# Patient Record
Sex: MALE | Race: BLACK OR AFRICAN AMERICAN | NOT HISPANIC OR LATINO | Employment: FULL TIME | URBAN - METROPOLITAN AREA
[De-identification: names, ages, dates, MRNs, and addresses within clinical notes are randomized per-mention and may not be internally consistent; named-entity substitution may affect disease eponyms.]

---

## 2017-05-04 ENCOUNTER — ALLSCRIPTS OFFICE VISIT (OUTPATIENT)
Dept: OTHER | Facility: OTHER | Age: 52
End: 2017-05-04

## 2017-05-26 ENCOUNTER — GENERIC CONVERSION - ENCOUNTER (OUTPATIENT)
Dept: OTHER | Facility: OTHER | Age: 52
End: 2017-05-26

## 2017-05-26 LAB
MAGNESIUM, UR (HISTORICAL): NORMAL
MICROALBUMIN/CREATININE RATIO (HISTORICAL): NORMAL

## 2017-06-13 ENCOUNTER — GENERIC CONVERSION - ENCOUNTER (OUTPATIENT)
Dept: OTHER | Facility: OTHER | Age: 52
End: 2017-06-13

## 2017-06-26 ENCOUNTER — GENERIC CONVERSION - ENCOUNTER (OUTPATIENT)
Dept: OTHER | Facility: OTHER | Age: 52
End: 2017-06-26

## 2017-07-11 ENCOUNTER — GENERIC CONVERSION - ENCOUNTER (OUTPATIENT)
Dept: OTHER | Facility: OTHER | Age: 52
End: 2017-07-11

## 2017-07-24 ENCOUNTER — GENERIC CONVERSION - ENCOUNTER (OUTPATIENT)
Dept: OTHER | Facility: OTHER | Age: 52
End: 2017-07-24

## 2017-08-08 ENCOUNTER — GENERIC CONVERSION - ENCOUNTER (OUTPATIENT)
Dept: OTHER | Facility: OTHER | Age: 52
End: 2017-08-08

## 2017-08-23 ENCOUNTER — GENERIC CONVERSION - ENCOUNTER (OUTPATIENT)
Dept: OTHER | Facility: OTHER | Age: 52
End: 2017-08-23

## 2017-09-05 ENCOUNTER — GENERIC CONVERSION - ENCOUNTER (OUTPATIENT)
Dept: OTHER | Facility: OTHER | Age: 52
End: 2017-09-05

## 2017-09-11 ENCOUNTER — GENERIC CONVERSION - ENCOUNTER (OUTPATIENT)
Dept: OTHER | Facility: OTHER | Age: 52
End: 2017-09-11

## 2017-09-19 ENCOUNTER — GENERIC CONVERSION - ENCOUNTER (OUTPATIENT)
Dept: OTHER | Facility: OTHER | Age: 52
End: 2017-09-19

## 2017-09-21 ENCOUNTER — GENERIC CONVERSION - ENCOUNTER (OUTPATIENT)
Dept: OTHER | Facility: OTHER | Age: 52
End: 2017-09-21

## 2017-09-21 DIAGNOSIS — E11.49 TYPE 2 DIABETES MELLITUS WITH OTHER DIABETIC NEUROLOGICAL COMPLICATION (HCC): ICD-10-CM

## 2017-09-22 ENCOUNTER — GENERIC CONVERSION - ENCOUNTER (OUTPATIENT)
Dept: OTHER | Facility: OTHER | Age: 52
End: 2017-09-22

## 2017-12-21 ENCOUNTER — ALLSCRIPTS OFFICE VISIT (OUTPATIENT)
Dept: OTHER | Facility: OTHER | Age: 52
End: 2017-12-21

## 2017-12-21 DIAGNOSIS — E11.9 TYPE 2 DIABETES MELLITUS WITHOUT COMPLICATIONS (HCC): ICD-10-CM

## 2017-12-21 DIAGNOSIS — E03.9 HYPOTHYROIDISM: ICD-10-CM

## 2017-12-23 NOTE — PROGRESS NOTES
Assessment   1  Hypothyroidism in adult (244 9) (E03 9)   2  Controlled diabetes mellitus (250 00) (E11 9)    Plan   Controlled diabetes mellitus    · (1) COMPREHENSIVE METABOLIC PANEL; Status:Active; Requested for:62Zep6427;    · (1) HEMOGLOBIN A1C; Status:Active; Requested for:27Isu5548;    · (1) LIPID PANEL, FASTING; Status:Active; Requested for:82Fef1100;    · (Q) MICROALBUMIN, RANDOM URINE (W/CREATININE); Status:Active; Requested    for:05Ahp6653;   Hypothyroidism in adult    · (1) TSH; Status:Active; Requested for:48Hpb8428;   Type 2 diabetes mellitus with other neurologic complication    · Jardiance 25 MG Oral Tablet    Discussion/Summary      Diabetes controlled plan of care, reviewed home blood sugars, doing very well hemoglobin A1c 5 point 9, down from 7  3  has done quite well diabetic control encouraged to continue same, weight loss goal 1 80  To continue current medications, schedule Optometry eval     The patient was counseled regarding instructions for management,-- patient and family education,-- importance of compliance with treatment  Possible side effects of new medications were reviewed with the patient/guardian today  The treatment plan was reviewed with the patient/guardian  The patient/guardian understands and agrees with the treatment plan      Chief Complaint   Pt is here for a three month f/u with labs      History of Present Illness   here to f/u  has been checking the BS average 115's , lowest number 79 and felt the symptoms did miss meal  for [employer] work this past weekend [employer], has information on the phone  5 9 and chol  been working at diet , lost 50 lbs , has been walking daily with steps counter ,and keeping kristen counts total intake less than  last therapy on [address] , C/ Eve[address] , heme md jacobo      Review of Systems        Constitutional: No fever or chills, feels well, no tiredness, no recent weight gain or weight loss        Eyes: No complaints of eye pain, no red eyes, no discharge from eyes, no itchy eyes  ENT: no complaints of earache, no hearing loss, no nosebleeds, no nasal discharge, no sore throat, no hoarseness  Cardiovascular: No complaints of slow heart rate, no fast heart rate, no chest pain, no palpitations, no leg claudication, no lower extremity  Respiratory: No complaints of shortness of breath, no wheezing, no cough, no SOB on exertion, no orthopnea or PND  Gastrointestinal: No complaints of abdominal pain, no constipation, no nausea or vomiting, no diarrhea or bloody stools  Genitourinary: No complaints of dysuria, no incontinence, no hesitancy, no nocturia, no genital lesion, no testicular pain  Musculoskeletal: No complaints of arthralgia, no myalgias, no joint swelling or stiffness, no limb pain or swelling  Integumentary: No complaints of skin rash or skin lesions, no itching, no skin wound, no dry skin  Neurological: No compliants of headache, no confusion, no convulsions, no numbness or tingling, no dizziness or fainting, no limb weakness, no difficulty walking  Psychiatric: Is not suicidal, no sleep disturbances, no anxiety or depression, no change in personality, no emotional problems  Endocrine: No complaints of proptosis, no hot flashes, no muscle weakness, no erectile dysfunction, no deepening of the voice, no feelings of weakness  Hematologic/Lymphatic: No complaints of swollen glands, no swollen glands in the neck, does not bleed easily, no easy bruising  ROS reviewed  Active Problems   1  Encounter for screening colonoscopy (V76 51) (Z12 11)   2  Hyperlipidemia, mixed (272 2) (E78 2)   3  Hypertension, benign (401 1) (I10)   4  Hypothyroidism in adult (244 9) (E03 9)   5  Malignant melanoma (172 9) (C43 9)   6  Snores (786 09) (R06 83)   7  Type 2 diabetes mellitus with other neurologic complication (110 72) (I93 01)   8  Vitamin D deficiency (268 9) (E55 9)    Past Medical History   1  History of Diabetes type 2, uncontrolled (250 02) (E11 65)   2  History of Diverticulitis (562 11) (K57 92)   3  History of sleep apnea (V13 89) (Z86 69)     The active problems and past medical history were reviewed and updated today  Surgical History   1  History of Resection Of Long Biceps Tendon     The surgical history was reviewed and updated today  Family History   Mother    1  Family history of Parkinson's disease dementia  Father    2  Family history of leukemia (V16 6) (Z80 6)     The family history was reviewed and updated today  Social History    · Always uses seat belt   · Daily caffeine consumption   · Denies alcohol consumption (V49 89) (Z78 9)   ·    · Never a smoker  The social history was reviewed and updated today  The social history was reviewed and is unchanged  Current Meds    1  Accu-Chek Darshana Device; check sugar twice a day; Therapy: 79FSU2874 to (Last Yvette Manifold)  Requested for: 36Ucb3313 Ordered   2  Accu-Chek Darshana Plus In Vitro Strip; Check blood sugar once daily fasting and 2 hours     after a large meal;     Therapy: 97Fro8579 to (Last Rx:32Txs0333)  Requested for: 15Kmq8692 Ordered   3  Fenofibrate 145 MG Oral Tablet; TAKE 1 TABLET DAILY WITH FOOD; Therapy: 70RGG4421 to (Kevin Yu)  Requested for: 85JNJ1708; Last     Rx:85Ddj4111 Ordered   4  Jardiance 25 MG Oral Tablet; TAKE 1 TABLET BY MOUTH ONCE DAILY  Requested for:     21IXG5311; Last Rx:95Wgm0362 Ordered   5  Levothyroxine Sodium 25 MCG Oral Tablet; TAKE ONE TABLET BY MOUTH ONCE DAILY      Requested for: 66ZMV2332; Last Rx:58Sxk9639 Ordered   6  MetFORMIN HCl - 1000 MG Oral Tablet; take 1 tablet by mouth once daily; Therapy: 99Xts6535 to (Last Rx:08Edf7532)  Requested for: 60Kyv8992 Ordered   7  Vitamin D3 50914 UNIT Oral Capsule; one pill twice a week till done;      Therapy: 31TLG6096 to (Last JU:21QZN7956)  Requested for: 12LVS3422 Ordered     The medication list was reviewed and updated today  Allergies   1  No Known Drug Allergies    Vitals   Vital Signs    Recorded: 44Bvi5231 04:59PM   Temperature 97 9 F   Heart Rate 62   Systolic 309   Diastolic 70   Height 5 ft 10 in   Weight 231 lb    BMI Calculated 33 15   BSA Calculated 2 22   O2 Saturation 97     Physical Exam        Constitutional      General appearance: No acute distress, well appearing and well nourished  Eyes      Conjunctiva and lids: No swelling, erythema, or discharge  Pupils and irises: Equal, round and reactive to light  Ears, Nose, Mouth, and Throat      External inspection of ears and nose: Normal        Otoscopic examination: Tympanic membrance translucent with normal light reflex  Canals patent without erythema  Nasal mucosa, septum, and turbinates: Normal without edema or erythema  Oropharynx: Normal with no erythema, edema, exudate or lesions  Pulmonary      Respiratory effort: No increased work of breathing or signs of respiratory distress  Auscultation of lungs: Clear to auscultation, equal breath sounds bilaterally, no wheezes, no rales, no rhonci  Cardiovascular      Auscultation of heart: Normal rate and rhythm, normal S1 and S2, without murmurs  -- Right lower extremity lymphedema  Lymphatic      Palpation of lymph nodes in neck: No lymphadenopathy  Musculoskeletal      Gait and station: Normal        Digits and nails: Normal without clubbing or cyanosis  Skin      Skin and subcutaneous tissue: Normal without rashes or lesions  Psychiatric      Orientation to person, place and time: Normal        Mood and affect: Normal              Socks and shoes removed, Right Foot Findings: normal foot, no swelling, no erythema  The right toes were normal       The sensory exam showed normal vibratory sensation at the level of the toes on the right  Normal tactile sensation with monofilament testing throughout the right foot        Socks and shoes removed, Left Foot Findings: normal foot, no swelling, no erythema  The left toes were normal       The sensory exam showed normal vibratory sensation at the level of the toes on the left  Normal tactile sensation with monofilament testing throughout the left foot  Pulses:      2+ in the posterior tibialis on the right      2+ in the dorsalis pedis on the right  Pulses:      2+ in the posterior tibialis on the left      2+ in the dorsalis pedis on the left  Assign Risk Category: 0: No loss of protective sensation, no deformity  No present risk  Future Appointments      Date/Time Provider Specialty Site   06/21/2018 05:15 PM Chase Monaco, [facility]     Signatures    Electronically signed by :  ADONIS Radford; Dec 21 2017  6:13PM EST                       (Author)     Electronically signed by : BRADY Lawrence ; Dec 22 2017  8:30AM EST

## 2018-01-14 VITALS
WEIGHT: 277.81 LBS | DIASTOLIC BLOOD PRESSURE: 62 MMHG | SYSTOLIC BLOOD PRESSURE: 110 MMHG | HEART RATE: 75 BPM | OXYGEN SATURATION: 97 % | BODY MASS INDEX: 39.77 KG/M2 | HEIGHT: 70 IN

## 2018-01-22 VITALS
OXYGEN SATURATION: 99 % | WEIGHT: 232 LBS | HEART RATE: 58 BPM | SYSTOLIC BLOOD PRESSURE: 90 MMHG | RESPIRATION RATE: 14 BRPM | HEIGHT: 70 IN | DIASTOLIC BLOOD PRESSURE: 70 MMHG | BODY MASS INDEX: 33.21 KG/M2

## 2018-01-23 VITALS
WEIGHT: 231 LBS | OXYGEN SATURATION: 97 % | DIASTOLIC BLOOD PRESSURE: 70 MMHG | TEMPERATURE: 97.9 F | BODY MASS INDEX: 33.07 KG/M2 | HEART RATE: 62 BPM | HEIGHT: 70 IN | SYSTOLIC BLOOD PRESSURE: 112 MMHG

## 2018-06-13 DIAGNOSIS — E03.9 HYPOTHYROIDISM, UNSPECIFIED TYPE: Primary | ICD-10-CM

## 2018-06-13 RX ORDER — LEVOTHYROXINE SODIUM 0.03 MG/1
25 TABLET ORAL DAILY
Qty: 30 TABLET | Refills: 3 | Status: SHIPPED | OUTPATIENT
Start: 2018-06-13 | End: 2018-07-09 | Stop reason: CLARIF

## 2018-06-13 RX ORDER — LEVOTHYROXINE SODIUM 0.03 MG/1
1 TABLET ORAL DAILY
COMMUNITY
End: 2018-06-13 | Stop reason: SDUPTHER

## 2018-07-09 ENCOUNTER — OFFICE VISIT (OUTPATIENT)
Dept: FAMILY MEDICINE CLINIC | Facility: CLINIC | Age: 53
End: 2018-07-09
Payer: COMMERCIAL

## 2018-07-09 VITALS
OXYGEN SATURATION: 98 % | WEIGHT: 249.4 LBS | BODY MASS INDEX: 35.79 KG/M2 | DIASTOLIC BLOOD PRESSURE: 72 MMHG | HEART RATE: 58 BPM | SYSTOLIC BLOOD PRESSURE: 128 MMHG

## 2018-07-09 DIAGNOSIS — E11.29 TYPE 2 DIABETES MELLITUS WITH OTHER DIABETIC KIDNEY COMPLICATION, WITHOUT LONG-TERM CURRENT USE OF INSULIN (HCC): Primary | ICD-10-CM

## 2018-07-09 DIAGNOSIS — E03.9 HYPOTHYROIDISM, UNSPECIFIED TYPE: ICD-10-CM

## 2018-07-09 DIAGNOSIS — C43.9 MALIGNANT MELANOMA, UNSPECIFIED SITE (HCC): ICD-10-CM

## 2018-07-09 DIAGNOSIS — E78.2 MIXED HYPERLIPIDEMIA: ICD-10-CM

## 2018-07-09 PROCEDURE — 99214 OFFICE O/P EST MOD 30 MIN: CPT | Performed by: FAMILY MEDICINE

## 2018-07-09 PROCEDURE — 3066F NEPHROPATHY DOC TX: CPT | Performed by: FAMILY MEDICINE

## 2018-07-09 RX ORDER — ASPIRIN 81 MG/1
81 TABLET ORAL
COMMUNITY

## 2018-07-09 RX ORDER — PROCHLORPERAZINE MALEATE 10 MG
TABLET ORAL
Refills: 5 | COMMUNITY
Start: 2018-04-11

## 2018-07-09 RX ORDER — DABRAFENIB 75 MG/1
CAPSULE ORAL
COMMUNITY
Start: 2018-07-06

## 2018-07-09 RX ORDER — ONDANSETRON HYDROCHLORIDE 8 MG/1
TABLET, FILM COATED ORAL
COMMUNITY
Start: 2018-06-26

## 2018-07-09 RX ORDER — LEVOTHYROXINE SODIUM 0.07 MG/1
75 TABLET ORAL
Qty: 90 TABLET | Refills: 1 | Status: SHIPPED | OUTPATIENT
Start: 2018-07-09 | End: 2018-10-15 | Stop reason: SDUPTHER

## 2018-07-09 RX ORDER — TRAMETINIB 2 MG/1
TABLET, FILM COATED ORAL
COMMUNITY
Start: 2018-07-06

## 2018-07-09 RX ORDER — LEVOTHYROXINE SODIUM 0.07 MG/1
TABLET ORAL
Refills: 2 | COMMUNITY
Start: 2018-04-06 | End: 2018-07-09 | Stop reason: CLARIF

## 2018-07-09 RX ORDER — FENOFIBRATE 145 MG/1
145 TABLET, COATED ORAL DAILY
Refills: 3 | COMMUNITY
Start: 2018-04-12 | End: 2018-10-22 | Stop reason: SDUPTHER

## 2018-07-09 RX ORDER — PREDNISONE 10 MG/1
TABLET ORAL
COMMUNITY
Start: 2018-07-01 | End: 2019-01-07 | Stop reason: ALTCHOICE

## 2018-07-09 NOTE — PROGRESS NOTES
Assessment/Plan:         Diagnoses and all orders for this visit:    Type 2 diabetes mellitus with other diabetic kidney complication, without long-term current use of insulin (HCC)  -     Linagliptin 5 MG TABS; Take 5 mg by mouth daily  -     CBC and differential; Future  -     Microalbumin / creatinine urine ratio; Future  -     Lipid panel; Future  -     Hemoglobin A1C; Future    Hypothyroidism, unspecified type  -     levothyroxine 75 mcg tablet; Take 1 tablet (75 mcg total) by mouth daily in the early morning  -     CBC and differential; Future  -     TSH, 3rd generation; Future    Malignant melanoma, unspecified site ([facility] [address] )    Other orders  -     fenofibrate (TRICOR) 145 mg tablet; Take 145 mg by mouth daily With food  -     prochlorperazine (COMPAZINE) 10 mg tablet; TAKE 1 TABLET (10 MG TOTAL) BY MOUTH EVERY 6 (SIX) HOURS AS NEEDED FOR NAUSEA OR VOMITING   -     Cholecalciferol (VITAMIN D3) 74154 units TABS; Take by mouth  -     predniSONE 10 mg tablet; Prednisone 60mg 7/2/18, then 50mg qd x3d, 40mg qdx 3d,30mg qdx3d,20mg qdx3d,then 5mg bid  -     metFORMIN (GLUCOPHAGE) 1000 MG tablet; Take 1 tablet by mouth daily  -     glucose blood test strip; by In Vitro route  -     Blood Glucose Monitoring Suppl (ACCU-CHEK APARNA CONNECT) w/Device KIT; by Does not apply route  -     aspirin (ECOTRIN LOW STRENGTH) 81 mg EC tablet; Take 81 mg by mouth  -     Discontinue: levothyroxine 75 mcg tablet; TAKE 1 TABLET (75 MCG TOTAL) BY MOUTH DAILY    -     Empagliflozin (JARDIANCE) 25 MG TABS; Take 1 tablet by mouth daily  -     Discontinue: Linagliptin 5 MG TABS; Take 5 mg by mouth  -     TAFINLAR 75 MG CAPS;   -     MEKINIST 2 MG TABS;   -     ondansetron (ZOFRAN) 8 mg tablet;        Diabetes  Stable, discussed current blood sugar values were within acceptable limits in light of recent kidney dysfunction secondary to dehydration decided to discontinue metformin, jard and supplement with Tradjenta,   melanoma malignant with metastasis  Currently under care oncology, no changes  Hypothyroid  Stable, to continue current medications TSH within normal limits          Subjective:      Patient ID: Veleta Buerger is a 48 y o  male  Here to f/u   Was recently in the ER and admitted secondary to dehydration was started on new med  For the melanoma, which caused some problems   Needed to switch off of some meds , metformin replaced by tradjenta  Am BS + 93 , postprandial 143,   Med in regard to thyroid need to be corrected currently taking the 75mcg , last TSH at [facility] 3 2   cholesterol no changes  Oncology md Tiesha Miller , in regard to melanoma new mass right femur ,   Left leg chronic lymphedema , no longer using the pump , per direction of the onc        The following portions of the patient's history were reviewed and updated as appropriate:   He has a past medical history of Diabetes type 2, uncontrolled ([facility] [address] ); Diverticulitis; and Sleep apnea  ,   does not have a problem list on file  ,   has a past surgical history that includes Other surgical history  ,  family history includes Leukemia in his father; Parkinsonism in his mother  ,   reports that he has never smoked  He has never used smokeless tobacco  He reports that he does not drink alcohol  His drug history is not on file  ,  has No Known Allergies         Review of Systems   Constitutional: Negative for appetite change, chills, fever and unexpected weight change  HENT: Negative for congestion, dental problem, ear pain, hearing loss, postnasal drip, rhinorrhea, sinus pain, sinus pressure, sneezing, sore throat, tinnitus and voice change  Eyes: Negative for visual disturbance  Respiratory: Negative for apnea, cough, chest tightness and shortness of breath  Cardiovascular: Negative for chest pain, palpitations and leg swelling  Gastrointestinal: Negative for abdominal pain, blood in stool, constipation, diarrhea, nausea and vomiting     Endocrine: Negative for cold intolerance, heat intolerance, polydipsia, polyphagia and polyuria  Genitourinary: Negative for decreased urine volume, difficulty urinating, dysuria, frequency and hematuria  Musculoskeletal: Negative for arthralgias, back pain, gait problem, joint swelling and myalgias  Skin: Negative for color change, rash and wound  Allergic/Immunologic: Negative for environmental allergies and food allergies  Neurological: Negative for dizziness, syncope, weakness, light-headedness, numbness and headaches  Hematological: Negative for adenopathy  Does not bruise/bleed easily  Psychiatric/Behavioral: Negative for sleep disturbance and suicidal ideas  The patient is not nervous/anxious  Objective:  Vitals:    07/09/18 1807   BP: 128/72   Pulse: 58   SpO2: 98%      Physical Exam   Constitutional: He is oriented to person, place, and time  He appears well-developed and well-nourished  HENT:   Head: Normocephalic and atraumatic  Eyes: EOM are normal    Neck: Normal range of motion  Neck supple  Cardiovascular: Normal rate, regular rhythm and normal heart sounds  Pulmonary/Chest: Effort normal and breath sounds normal    Musculoskeletal: Normal range of motion  Right lower extremity in compression dressing secondary to chronic lymphedema   Neurological: He is alert and oriented to person, place, and time  He has normal reflexes  Skin: Skin is warm and dry  Psychiatric: He has a normal mood and affect   His behavior is normal  Judgment and thought content normal

## 2018-07-17 RX ORDER — FENOFIBRATE 145 MG/1
TABLET, COATED ORAL
Qty: 90 TABLET | Refills: 3 | OUTPATIENT
Start: 2018-07-17

## 2018-07-20 ENCOUNTER — TELEPHONE (OUTPATIENT)
Dept: FAMILY MEDICINE CLINIC | Facility: CLINIC | Age: 53
End: 2018-07-20

## 2018-07-20 NOTE — TELEPHONE ENCOUNTER
Patient called in today stating that his hematologist took him off the fenofibrate and he would like to know if you want him to continue taking it today  He also wants to know if the Dorothyann Clock should be increase because he is on prednisone 10 mg daily now

## 2018-07-24 ENCOUNTER — TELEPHONE (OUTPATIENT)
Dept: FAMILY MEDICINE CLINIC | Facility: CLINIC | Age: 53
End: 2018-07-24

## 2018-07-24 NOTE — TELEPHONE ENCOUNTER
Pt called to give glucose levels  Been checking for past 2 days ranging from 120-180    Just had a CMP done at [facility] and glucose level was 76

## 2018-08-24 ENCOUNTER — TELEPHONE (OUTPATIENT)
Dept: FAMILY MEDICINE CLINIC | Facility: CLINIC | Age: 53
End: 2018-08-24

## 2018-08-24 NOTE — TELEPHONE ENCOUNTER
FBS  up to 270 in the morning  His first day off his chemo treatments his FBS has increased  Should he increase his tradjenta? Please advise

## 2018-08-24 NOTE — TELEPHONE ENCOUNTER
No, continue to monitor blood sugars over the weekend  Call back if it is not coming down in the next few days

## 2018-09-27 DIAGNOSIS — Z79.4 TYPE 2 DIABETES MELLITUS WITH HYPERGLYCEMIA, WITH LONG-TERM CURRENT USE OF INSULIN (HCC): Primary | ICD-10-CM

## 2018-09-27 DIAGNOSIS — E11.65 TYPE 2 DIABETES MELLITUS WITH HYPERGLYCEMIA, WITH LONG-TERM CURRENT USE OF INSULIN (HCC): Primary | ICD-10-CM

## 2018-10-08 LAB
CREAT ?TM UR-SCNC: 50.8 UMOL/L
HBA1C MFR BLD HPLC: 7.1 %

## 2018-10-09 ENCOUNTER — OFFICE VISIT (OUTPATIENT)
Dept: FAMILY MEDICINE CLINIC | Facility: CLINIC | Age: 53
End: 2018-10-09
Payer: COMMERCIAL

## 2018-10-09 VITALS
SYSTOLIC BLOOD PRESSURE: 130 MMHG | DIASTOLIC BLOOD PRESSURE: 76 MMHG | BODY MASS INDEX: 38.65 KG/M2 | HEART RATE: 65 BPM | WEIGHT: 270 LBS | HEIGHT: 70 IN | RESPIRATION RATE: 17 BRPM | TEMPERATURE: 99.2 F | OXYGEN SATURATION: 96 %

## 2018-10-09 DIAGNOSIS — C43.9 MALIGNANT MELANOMA, UNSPECIFIED SITE (HCC): ICD-10-CM

## 2018-10-09 DIAGNOSIS — E03.9 HYPOTHYROIDISM, UNSPECIFIED TYPE: ICD-10-CM

## 2018-10-09 DIAGNOSIS — E11.65 TYPE 2 DIABETES MELLITUS WITH HYPERGLYCEMIA, WITHOUT LONG-TERM CURRENT USE OF INSULIN (HCC): Primary | ICD-10-CM

## 2018-10-09 PROCEDURE — 99214 OFFICE O/P EST MOD 30 MIN: CPT | Performed by: FAMILY MEDICINE

## 2018-10-09 PROCEDURE — 1036F TOBACCO NON-USER: CPT | Performed by: FAMILY MEDICINE

## 2018-10-09 RX ORDER — LORAZEPAM 0.5 MG/1
TABLET ORAL
COMMUNITY
Start: 2018-10-08

## 2018-10-09 NOTE — PROGRESS NOTES
Assessment/Plan:         Diagnoses and all orders for this visit:    Type 2 diabetes mellitus with hyperglycemia, without long-term current use of insulin (HCC)  -     Comprehensive metabolic panel; Future  -     TSH, 3rd generation; Future  -     Hemoglobin A1C; Future  -     CBC and differential; Future    Hypothyroidism, unspecified type  -     Comprehensive metabolic panel; Future  -     TSH, 3rd generation; Future    Malignant melanoma, unspecified site [facility])  -     Comprehensive metabolic panel; Future  -     TSH, 3rd generation; Future  -     CBC and differential; Future    Other orders  -     LORazepam (ATIVAN) 0 5 mg tablet;           Diabetes  Discussed plan of care to continue current medications and therapies bump in A1c secondary to prednisone therapy,   hypothyroidism next   to continue current medications will obtain updated TSH   hyperlipidemia   stable, remains essentially unchanged to continue current medications   melanoma, malignant   currently under care hematology Oncology [facility]    Subjective:      Patient ID: Harrison Victor is a 48 y o  male  F/u   Currently  Under treatment for cancer melanoma , initial right groin   Has f/u PET and ct scans coming , to determine any mets to brain ?   md jacobo   Has been on additional chemo meds , also prednisone for low cortisol   Not happy with the pred , causing weight gain   Has been monitoring home bs , range 88 occasional spike 120-150   Denies any changes in vision , increasing urine pattern , appetite has stayed the same,   Negative chest pain palpitations shortness of breath difficulty breathing cough lesions rash          The following portions of the patient's history were reviewed and updated as appropriate:   He has a past medical history of Diabetes type 2, uncontrolled ([facility] [address] ); Diverticulitis; and Sleep apnea  ,   does not have a problem list on file  ,   has a past surgical history that includes Other surgical history  ,  family history includes Leukemia in his father; Parkinsonism in his mother  ,   reports that he has never smoked  He has never used smokeless tobacco  He reports that he does not drink alcohol  His drug history is not on file  ,  has No Known Allergies         Review of Systems   Constitutional: Negative for appetite change, chills, fever and unexpected weight change  HENT: Negative for congestion, dental problem, ear pain, hearing loss, postnasal drip, rhinorrhea, sinus pain, sinus pressure, sneezing, sore throat, tinnitus and voice change  Eyes: Negative for visual disturbance  Respiratory: Negative for apnea, cough, chest tightness and shortness of breath  Cardiovascular: Negative for chest pain, palpitations and leg swelling  Gastrointestinal: Negative for abdominal pain, blood in stool, constipation, diarrhea, nausea and vomiting  Endocrine: Negative for cold intolerance, heat intolerance, polydipsia, polyphagia and polyuria  Genitourinary: Negative for decreased urine volume, difficulty urinating, dysuria, frequency and hematuria  Musculoskeletal: Negative for arthralgias, back pain, gait problem, joint swelling and myalgias  Skin: Negative for color change, rash and wound  Allergic/Immunologic: Negative for environmental allergies and food allergies  Neurological: Negative for dizziness, syncope, weakness, light-headedness, numbness and headaches  Hematological: Negative for adenopathy  Does not bruise/bleed easily  Psychiatric/Behavioral: Negative for sleep disturbance and suicidal ideas  The patient is not nervous/anxious  Objective:  Vitals:    10/09/18 1717   BP: 130/76   Pulse: 65   Resp: 17   Temp: 99 2 °F (37 3 °C)   SpO2: 96%      Physical Exam   Constitutional: He is oriented to person, place, and time  He appears well-developed and well-nourished  HENT:   Head: Normocephalic and atraumatic  Eyes: EOM are normal    Neck: Normal range of motion  Neck supple     Cardiovascular: Normal rate, regular rhythm and normal heart sounds  Pulmonary/Chest: Effort normal and breath sounds normal    Abdominal: Soft  Bowel sounds are normal    Musculoskeletal: Normal range of motion  Neurological: He is alert and oriented to person, place, and time  He has normal reflexes  Skin: Skin is warm and dry  Right lower extremity in compression dressing secondary to chronic lymphedema, secondary to melanoma right groin   Psychiatric: He has a normal mood and affect   His behavior is normal  Judgment and thought content normal

## 2018-10-15 ENCOUNTER — TELEPHONE (OUTPATIENT)
Dept: FAMILY MEDICINE CLINIC | Facility: CLINIC | Age: 53
End: 2018-10-15

## 2018-10-15 DIAGNOSIS — E03.9 HYPOTHYROIDISM (ACQUIRED): Primary | ICD-10-CM

## 2018-10-15 DIAGNOSIS — E03.9 HYPOTHYROIDISM, UNSPECIFIED TYPE: ICD-10-CM

## 2018-10-15 RX ORDER — LEVOTHYROXINE SODIUM 88 UG/1
88 TABLET ORAL
Qty: 30 TABLET | Refills: 2 | Status: SHIPPED | OUTPATIENT
Start: 2018-10-15 | End: 2018-11-12 | Stop reason: SDUPTHER

## 2018-10-15 NOTE — TELEPHONE ENCOUNTER
Pt aware  Per pt he may have trouble picking the medication up from [facility]    He has hydration and MRI's coming up after work, will see if a family member can pick it up  I created the lab order and mailed it out

## 2018-10-22 DIAGNOSIS — E78.2 MIXED HYPERLIPIDEMIA: Primary | ICD-10-CM

## 2018-10-22 RX ORDER — FENOFIBRATE 145 MG/1
TABLET, COATED ORAL
Qty: 90 TABLET | Refills: 3 | Status: SHIPPED | OUTPATIENT
Start: 2018-10-22

## 2018-11-12 DIAGNOSIS — E03.9 HYPOTHYROIDISM, UNSPECIFIED TYPE: ICD-10-CM

## 2018-11-12 RX ORDER — LEVOTHYROXINE SODIUM 88 UG/1
88 TABLET ORAL
Qty: 30 TABLET | Refills: 0 | Status: SHIPPED | OUTPATIENT
Start: 2018-11-12 | End: 2018-11-14 | Stop reason: SDUPTHER

## 2018-11-13 ENCOUNTER — TELEPHONE (OUTPATIENT)
Dept: FAMILY MEDICINE CLINIC | Facility: CLINIC | Age: 53
End: 2018-11-13

## 2018-11-13 NOTE — TELEPHONE ENCOUNTER
He mis informed you of his Levothyroxine dose  He was taking the 88 mcg (not the 75 mcg)  So you need to adjust it up from the 88 mcg

## 2018-11-14 DIAGNOSIS — E03.9 HYPOTHYROIDISM, UNSPECIFIED TYPE: ICD-10-CM

## 2018-11-14 RX ORDER — LEVOTHYROXINE SODIUM 0.1 MG/1
100 TABLET ORAL
Qty: 30 TABLET | Refills: 3 | Status: SHIPPED | OUTPATIENT
Start: 2018-11-14

## 2018-12-07 DIAGNOSIS — Z79.4 TYPE 2 DIABETES MELLITUS WITH HYPERGLYCEMIA, WITH LONG-TERM CURRENT USE OF INSULIN (HCC): ICD-10-CM

## 2018-12-07 DIAGNOSIS — E11.65 TYPE 2 DIABETES MELLITUS WITH HYPERGLYCEMIA, WITH LONG-TERM CURRENT USE OF INSULIN (HCC): ICD-10-CM

## 2018-12-20 DIAGNOSIS — Z79.4 TYPE 2 DIABETES MELLITUS WITH HYPERGLYCEMIA, WITH LONG-TERM CURRENT USE OF INSULIN (HCC): Primary | ICD-10-CM

## 2018-12-20 DIAGNOSIS — E11.65 TYPE 2 DIABETES MELLITUS WITH HYPERGLYCEMIA, WITH LONG-TERM CURRENT USE OF INSULIN (HCC): Primary | ICD-10-CM

## 2018-12-29 DIAGNOSIS — E03.9 HYPOTHYROIDISM, UNSPECIFIED TYPE: ICD-10-CM

## 2018-12-29 DIAGNOSIS — E11.29 TYPE 2 DIABETES MELLITUS WITH OTHER DIABETIC KIDNEY COMPLICATION, WITHOUT LONG-TERM CURRENT USE OF INSULIN (HCC): ICD-10-CM

## 2018-12-31 RX ORDER — LINAGLIPTIN 5 MG/1
TABLET, FILM COATED ORAL
Qty: 90 TABLET | Refills: 1 | Status: SHIPPED | OUTPATIENT
Start: 2018-12-31

## 2018-12-31 RX ORDER — LEVOTHYROXINE SODIUM 0.07 MG/1
75 TABLET ORAL
Qty: 90 TABLET | Refills: 1 | Status: SHIPPED | OUTPATIENT
Start: 2018-12-31

## 2019-01-07 ENCOUNTER — TELEPHONE (OUTPATIENT)
Dept: FAMILY MEDICINE CLINIC | Facility: CLINIC | Age: 54
End: 2019-01-07

## 2019-01-07 RX ORDER — DEXAMETHASONE 4 MG/1
TABLET ORAL
COMMUNITY
Start: 2018-12-21

## 2019-01-07 NOTE — TELEPHONE ENCOUNTER
Pt left a message on the medline stating the other Dr's are recommending Tradjenta 5mg be increased  called the pt to verify why the medication need to be changed   Per he had brain radiation done  And medications where changed   He is currently on dexamethasone 4mg and was taken off the prednisone  He had labs done with [facility] 1/4   Glucose 585 and A1C 9 8  I am requesting the labs and consult notes from [address] [place] [address] Drive

## 2019-01-18 ENCOUNTER — TELEPHONE (OUTPATIENT)
Dept: FAMILY MEDICINE CLINIC | Facility: CLINIC | Age: 54
End: 2019-01-18

## 2019-01-18 NOTE — TELEPHONE ENCOUNTER
Jaya Denson from [facility] called- his FBS is elevated 240  After lunch- sandwich  451  He is taking Lantus 60 U daily and Tradjenta 5 mg daily  He just finished a steroid dexamethasone 4 mg 2 x's daily yesterday  Please advise

## 2019-01-18 NOTE — TELEPHONE ENCOUNTER
Continue to follow blood sugar for the time being  Call back next week if blood sugars are not coming down off dexamethasone

## 2019-01-18 NOTE — TELEPHONE ENCOUNTER
MATHEW    A  from [place] called to inform you that is available to help if needs to better assist the patient     She left her contact information and was made aware the patient missed his last two appt    She will contact the patients to schedule another appt

## 2019-01-21 RX ORDER — METRONIDAZOLE 7.5 MG/G
GEL TOPICAL
COMMUNITY
Start: 2019-01-14

## 2019-01-21 RX ORDER — POTASSIUM CHLORIDE 20 MEQ/1
20 TABLET, EXTENDED RELEASE ORAL
COMMUNITY
Start: 2019-01-14

## 2019-01-21 RX ORDER — FUROSEMIDE 20 MG/1
20 TABLET ORAL
COMMUNITY
Start: 2019-01-14 | End: 2020-01-14

## 2019-01-21 RX ORDER — METHOCARBAMOL 500 MG/1
500 TABLET, FILM COATED ORAL EVERY 6 HOURS PRN
COMMUNITY
Start: 2019-01-14

## 2019-01-21 NOTE — TELEPHONE ENCOUNTER
Completed steroid treatment, continue to monitor home blood sugars, now that he is off the steroids sugars may be coming  Call with numbers on Thursday

## 2019-01-21 NOTE — TELEPHONE ENCOUNTER
Spoke with the patient he stated that he was given lantus when he was hospitalized back in December he was originally on the Pill but his a1c was high while he was getting chemo so they admitted him and started him on lantus

## 2019-01-24 NOTE — TELEPHONE ENCOUNTER
Spoke to the pts wife Lotus Sutherlander ) he currently in the hospital ( [facility] )